# Patient Record
Sex: MALE | Race: ASIAN | NOT HISPANIC OR LATINO | Employment: STUDENT | URBAN - METROPOLITAN AREA
[De-identification: names, ages, dates, MRNs, and addresses within clinical notes are randomized per-mention and may not be internally consistent; named-entity substitution may affect disease eponyms.]

---

## 2019-03-07 ENCOUNTER — HOSPITAL ENCOUNTER (EMERGENCY)
Facility: HOSPITAL | Age: 29
Discharge: HOME | End: 2019-03-07
Attending: EMERGENCY MEDICINE | Admitting: EMERGENCY MEDICINE

## 2019-03-07 ENCOUNTER — APPOINTMENT (OUTPATIENT)
Dept: GENERAL RADIOLOGY | Facility: HOSPITAL | Age: 29
End: 2019-03-07

## 2019-03-07 VITALS
HEIGHT: 66 IN | DIASTOLIC BLOOD PRESSURE: 66 MMHG | RESPIRATION RATE: 18 BRPM | TEMPERATURE: 98.1 F | SYSTOLIC BLOOD PRESSURE: 120 MMHG | OXYGEN SATURATION: 100 % | BODY MASS INDEX: 26.84 KG/M2 | WEIGHT: 167 LBS | HEART RATE: 62 BPM

## 2019-03-07 DIAGNOSIS — S97.82XA CRUSHING INJURY OF LEFT FOOT, INITIAL ENCOUNTER: ICD-10-CM

## 2019-03-07 DIAGNOSIS — S90.32XA CONTUSION OF LEFT FOOT, INITIAL ENCOUNTER: Primary | ICD-10-CM

## 2019-03-07 PROCEDURE — 73630 X-RAY EXAM OF FOOT: CPT

## 2019-03-07 PROCEDURE — 99283 EMERGENCY DEPT VISIT LOW MDM: CPT

## 2019-03-07 RX ORDER — IBUPROFEN 200 MG
200 TABLET ORAL EVERY 6 HOURS PRN
COMMUNITY

## 2019-03-07 NOTE — ED PROVIDER NOTES
Subjective   Pt is a 27 yo male presenting to ED with left foot pain after playing soccer yesterday. He states another player stepped on his foot with a cleated shoe. He is having swelling and pain with weight bearing to the top of his foot but no significant difficulty bearing weight. He was able to continue to run on his foot after injury. No puncture to skin. He denies prior hx of injury to left foot. No other complaints.         History provided by:  Patient  Foot Pain   Location:  Left foot   Quality:  Aching, swelling   Duration:  1 day  Progression:  Partially resolved  Chronicity:  New  Context:  Stepped on playing soccer  Relieved by:  Elevation   Worsened by:  Bearing weight       Review of Systems   Musculoskeletal: Positive for arthralgias (Left foot ).   All other systems reviewed and are negative.      Past Medical History:   Diagnosis Date   • Migraine        No Known Allergies    Past Surgical History:   Procedure Laterality Date   • APPENDECTOMY         History reviewed. No pertinent family history.    Social History     Socioeconomic History   • Marital status:      Spouse name: Not on file   • Number of children: Not on file   • Years of education: Not on file   • Highest education level: Not on file   Tobacco Use   • Smoking status: Never Smoker   Substance and Sexual Activity   • Alcohol use: No     Frequency: Never   • Drug use: No   • Sexual activity: Defer           Objective   Physical Exam   Constitutional: He appears well-developed and well-nourished. No distress.   HENT:   Head: Atraumatic.   Neck: Normal range of motion.   Cardiovascular: Normal rate and intact distal pulses.   Pulmonary/Chest: Effort normal. No respiratory distress.   Musculoskeletal: Normal range of motion.        Left foot: There is tenderness (Over 2-4 metatarsals ) and swelling. There is normal range of motion, normal capillary refill and no deformity.   Neurological: He is alert.   Skin: Skin is warm.  "  Psychiatric: He has a normal mood and affect.   Nursing note and vitals reviewed.      Procedures           ED Course      Discussed xray results with patient. He declines crutches and is able to bear weight.      No results found for this or any previous visit (from the past 24 hour(s)).  Note: In addition to lab results from this visit, the labs listed above may include labs taken at another facility or during a different encounter within the last 24 hours. Please correlate lab times with ED admission and discharge times for further clarification of the services performed during this visit.    XR Foot 3+ View Left   Preliminary Result   No acute osseous abnormality of the left foot.       DICTATED:   3/7/2019   EDITED/ls :   3/7/2019             Vitals:    03/07/19 1430   BP: 120/66   BP Location: Left arm   Patient Position: Sitting   Pulse: 62   Resp: 18   Temp: 98.1 °F (36.7 °C)   TempSrc: Oral   SpO2: 100%   Weight: 75.8 kg (167 lb)   Height: 167.6 cm (66\")     Medications - No data to display       No orders to display                   MDM      Final diagnoses:   Contusion of left foot, initial encounter   Crushing injury of left foot, initial encounter            Tita Carey PA  03/07/19 3220    "